# Patient Record
Sex: FEMALE | Race: ASIAN | NOT HISPANIC OR LATINO | Employment: FULL TIME | URBAN - METROPOLITAN AREA
[De-identification: names, ages, dates, MRNs, and addresses within clinical notes are randomized per-mention and may not be internally consistent; named-entity substitution may affect disease eponyms.]

---

## 2023-01-09 LAB
HEPATITIS B SURFACE ANTIGEN (EXTERNAL): NEGATIVE
HEPATITIS B SURFACE ANTIGEN (EXTERNAL): NEGATIVE
HIV1+2 AB SERPL QL IA: NEGATIVE
HIV1+2 AB SERPL QL IA: NEGATIVE
RUBELLA ANTIBODY IGG (EXTERNAL): NORMAL
RUBELLA ANTIBODY IGG (EXTERNAL): NORMAL
TREPONEMA PALLIDUM ANTIBODY (EXTERNAL): NONREACTIVE

## 2023-07-13 LAB — GROUP B STREPTOCOCCUS (EXTERNAL): POSITIVE

## 2023-08-08 ENCOUNTER — MEDICAL CORRESPONDENCE (OUTPATIENT)
Dept: HEALTH INFORMATION MANAGEMENT | Facility: CLINIC | Age: 33
End: 2023-08-08

## 2023-08-16 ENCOUNTER — HOSPITAL ENCOUNTER (OUTPATIENT)
Facility: CLINIC | Age: 33
Discharge: HOME | End: 2023-08-16
Attending: OBSTETRICS & GYNECOLOGY | Admitting: OBSTETRICS & GYNECOLOGY
Payer: COMMERCIAL

## 2023-08-16 VITALS — TEMPERATURE: 98.3 F | DIASTOLIC BLOOD PRESSURE: 74 MMHG | RESPIRATION RATE: 16 BRPM | SYSTOLIC BLOOD PRESSURE: 112 MMHG

## 2023-08-16 PROBLEM — Z36.89 ENCOUNTER FOR TRIAGE IN PREGNANT PATIENT: Status: ACTIVE | Noted: 2023-08-16

## 2023-08-16 PROCEDURE — G0463 HOSPITAL OUTPT CLINIC VISIT: HCPCS

## 2023-08-16 RX ORDER — LEVOTHYROXINE SODIUM 150 UG/1
150 TABLET ORAL DAILY
COMMUNITY

## 2023-08-16 RX ORDER — VITAMIN A ACETATE, .BETA.-CAROTENE, ASCORBIC ACID, CHOLECALCIFEROL, .ALPHA.-TOCOPHEROL ACETATE, DL-, THIAMINE MONONITRATE, RIBOFLAVIN, NIACINAMIDE, PYRIDOXINE HYDROCHLORIDE, FOLIC ACID, CYANOCOBALAMIN, CALCIUM CARBONATE, FERROUS FUMARATE, ZINC OXIDE, AND CUPRIC OXIDE 2000; 2000; 120; 400; 22; 1.84; 3; 20; 10; 1; 12; 200; 27; 25; 2 [IU]/1; [IU]/1; MG/1; [IU]/1; MG/1; MG/1; MG/1; MG/1; MG/1; MG/1; UG/1; MG/1; MG/1; MG/1; MG/1
1 TABLET ORAL DAILY
COMMUNITY

## 2023-08-16 RX ORDER — ASPIRIN 81 MG/1
81 TABLET, CHEWABLE ORAL DAILY
Status: ON HOLD | COMMUNITY
End: 2023-08-19

## 2023-08-16 RX ORDER — LIDOCAINE 40 MG/G
CREAM TOPICAL
Status: DISCONTINUED | OUTPATIENT
Start: 2023-08-16 | End: 2023-08-16 | Stop reason: HOSPADM

## 2023-08-16 ASSESSMENT — ACTIVITIES OF DAILY LIVING (ADL)
ADLS_ACUITY_SCORE: 31
ADLS_ACUITY_SCORE: 31

## 2023-08-16 NOTE — DISCHARGE INSTRUCTIONS
Discharge Instruction for Undelivered Patients      You were seen for: {OB DC INST SEEN FOR OTHER:1570133}  We Consulted: Dr Aragon  You had (Test or Medicine):SVE, monitoring     Diet:   Drink 8 to 12 glasses of liquids (milk, juice, water) every day.  You may eat meals and snacks.     Activity:  Call your doctor or nurse midwife if your baby is moving less than usual.     Call your provider if you notice:  Swelling in your face or increased swelling in your hands or legs.  Headaches that are not relieved by Tylenol (acetaminophen).  Changes in your vision (blurring: seeing spots or stars.)  Nausea (sick to your stomach) and vomiting (throwing up).   Weight gain of 5 pounds or more per week.  Heartburn that doesn't go away.  Signs of bladder infection: pain when you urinate (use the toilet), need to go more often and more urgently.  The bag of buckley (rupture of membranes) breaks, or you notice leaking in your underwear.  Bright red blood in your underwear.  Abdominal (lower belly) or stomach pain.  For first baby: Contractions (tightening) less than 5 minutes apart for one hour or more.  Second (plus) baby: Contractions (tightening) less than 10 minutes apart and getting stronger.  *If less than 34 weeks: Contractions (tightening) more than 6 times in one hour.  Increase or change in vaginal discharge (note the color and amount)  Other: Call Dr office if anything changes    Follow-up:  Call L&D tomorrow morning 8/17/23 at 6:30 am to see if you are able to come in for induction.

## 2023-08-16 NOTE — PLAN OF CARE
Data: [facility] presented [facility]: 2023  6:11 AM.  Reason for maternal/fetal assessment is uterine contractions. Patient reports mild cramping q6 min, increasing in frequency since yesterday evening.  Patient is a .  Prenatal record reviewed. Pregnancy  has been complicated by GBS positive.  Gestational Age 41wk 1d. VSS. Fetal movement active. Patient denies leaking of vaginal fluid/rupture of membranes, abdominal pain, pelvic pressure, nausea, vomiting, headache, visual disturbances, epigastric or URQ pain, significant edema. Support person is present.   Action: Verbal consent for EFM. Triage assessment completed. Bill of rights reviewed.  Response: Patient verbalized agreement with plan. Will contact Dr. Aragon with update and for further orders.

## 2023-08-16 NOTE — PROVIDER NOTIFICATION
08/16/23 0756   Provider Notification   Provider Name/Title Mahesh   Method of Notification Phone   Notification Reason Patient Arrived;Status Update;CHAR Aragon notified of pt arrival, UC pattern, SVE & FHT's. Discussed need for 2 negative MRSA swabs. Pt has not been confirmed negative. Collect first swab tomorrow when pt comes for induction, assuming she is sent home this morning.

## 2023-08-17 ENCOUNTER — ANESTHESIA EVENT (OUTPATIENT)
Dept: OBGYN | Facility: CLINIC | Age: 33
End: 2023-08-17
Payer: COMMERCIAL

## 2023-08-17 ENCOUNTER — ANESTHESIA (OUTPATIENT)
Dept: OBGYN | Facility: CLINIC | Age: 33
End: 2023-08-17
Payer: COMMERCIAL

## 2023-08-17 ENCOUNTER — HOSPITAL ENCOUNTER (INPATIENT)
Facility: CLINIC | Age: 33
LOS: 3 days | Discharge: HOME | End: 2023-08-20
Attending: OBSTETRICS & GYNECOLOGY | Admitting: OBSTETRICS & GYNECOLOGY
Payer: COMMERCIAL

## 2023-08-17 DIAGNOSIS — Z36.89 ENCOUNTER FOR TRIAGE IN PREGNANT PATIENT: Primary | ICD-10-CM

## 2023-08-17 LAB
ABO/RH(D): NORMAL
ANTIBODY SCREEN: NEGATIVE
HGB BLD-MCNC: 12 G/DL (ref 11.7–15.7)
MRSA DNA SPEC QL NAA+PROBE: NEGATIVE
SA TARGET DNA: POSITIVE
SPECIMEN EXPIRATION DATE: NORMAL
T PALLIDUM AB SER QL: NONREACTIVE

## 2023-08-17 PROCEDURE — 250N000009 HC RX 250: Performed by: OBSTETRICS & GYNECOLOGY

## 2023-08-17 PROCEDURE — 250N000011 HC RX IP 250 OP 636: Mod: JZ | Performed by: ANESTHESIOLOGY

## 2023-08-17 PROCEDURE — 250N000011 HC RX IP 250 OP 636: Performed by: OBSTETRICS & GYNECOLOGY

## 2023-08-17 PROCEDURE — 370N000003 HC ANESTHESIA WARD SERVICE: Performed by: ANESTHESIOLOGY

## 2023-08-17 PROCEDURE — 86901 BLOOD TYPING SEROLOGIC RH(D): CPT | Performed by: OBSTETRICS & GYNECOLOGY

## 2023-08-17 PROCEDURE — 120N000001 HC R&B MED SURG/OB

## 2023-08-17 PROCEDURE — 85018 HEMOGLOBIN: CPT | Performed by: OBSTETRICS & GYNECOLOGY

## 2023-08-17 PROCEDURE — 258N000003 HC RX IP 258 OP 636: Performed by: OBSTETRICS & GYNECOLOGY

## 2023-08-17 PROCEDURE — 86780 TREPONEMA PALLIDUM: CPT | Performed by: OBSTETRICS & GYNECOLOGY

## 2023-08-17 PROCEDURE — 3E033VJ INTRODUCTION OF OTHER HORMONE INTO PERIPHERAL VEIN, PERCUTANEOUS APPROACH: ICD-10-PCS | Performed by: OBSTETRICS & GYNECOLOGY

## 2023-08-17 PROCEDURE — 86850 RBC ANTIBODY SCREEN: CPT | Performed by: OBSTETRICS & GYNECOLOGY

## 2023-08-17 PROCEDURE — 250N000011 HC RX IP 250 OP 636: Performed by: ANESTHESIOLOGY

## 2023-08-17 PROCEDURE — 00HU33Z INSERTION OF INFUSION DEVICE INTO SPINAL CANAL, PERCUTANEOUS APPROACH: ICD-10-PCS | Performed by: ANESTHESIOLOGY

## 2023-08-17 PROCEDURE — 3E0R3BZ INTRODUCTION OF ANESTHETIC AGENT INTO SPINAL CANAL, PERCUTANEOUS APPROACH: ICD-10-PCS | Performed by: ANESTHESIOLOGY

## 2023-08-17 PROCEDURE — 87641 MR-STAPH DNA AMP PROBE: CPT | Performed by: OBSTETRICS & GYNECOLOGY

## 2023-08-17 RX ORDER — PENICILLIN G 3000000 [IU]/50ML
3 INJECTION, SOLUTION INTRAVENOUS EVERY 4 HOURS
Status: DISCONTINUED | OUTPATIENT
Start: 2023-08-17 | End: 2023-08-18 | Stop reason: HOSPADM

## 2023-08-17 RX ORDER — FENTANYL CITRATE-0.9 % NACL/PF 10 MCG/ML
100 PLASTIC BAG, INJECTION (ML) INTRAVENOUS EVERY 5 MIN PRN
Status: DISCONTINUED | OUTPATIENT
Start: 2023-08-17 | End: 2023-08-18 | Stop reason: HOSPADM

## 2023-08-17 RX ORDER — NALOXONE HYDROCHLORIDE 0.4 MG/ML
0.2 INJECTION, SOLUTION INTRAMUSCULAR; INTRAVENOUS; SUBCUTANEOUS
Status: DISCONTINUED | OUTPATIENT
Start: 2023-08-17 | End: 2023-08-18 | Stop reason: HOSPADM

## 2023-08-17 RX ORDER — BUPIVACAINE HYDROCHLORIDE 2.5 MG/ML
INJECTION, SOLUTION EPIDURAL; INFILTRATION; INTRACAUDAL
Status: COMPLETED | OUTPATIENT
Start: 2023-08-17 | End: 2023-08-17

## 2023-08-17 RX ORDER — BUPIVACAINE HYDROCHLORIDE 2.5 MG/ML
10 INJECTION, SOLUTION EPIDURAL; INFILTRATION; INTRACAUDAL ONCE
Status: DISCONTINUED | OUTPATIENT
Start: 2023-08-17 | End: 2023-08-18 | Stop reason: HOSPADM

## 2023-08-17 RX ORDER — IBUPROFEN 800 MG/1
800 TABLET, FILM COATED ORAL
Status: DISCONTINUED | OUTPATIENT
Start: 2023-08-17 | End: 2023-08-18

## 2023-08-17 RX ORDER — KETOROLAC TROMETHAMINE 30 MG/ML
30 INJECTION, SOLUTION INTRAMUSCULAR; INTRAVENOUS
Status: DISCONTINUED | OUTPATIENT
Start: 2023-08-17 | End: 2023-08-18

## 2023-08-17 RX ORDER — LIDOCAINE 40 MG/G
CREAM TOPICAL
Status: DISCONTINUED | OUTPATIENT
Start: 2023-08-17 | End: 2023-08-18 | Stop reason: HOSPADM

## 2023-08-17 RX ORDER — NALOXONE HYDROCHLORIDE 0.4 MG/ML
0.4 INJECTION, SOLUTION INTRAMUSCULAR; INTRAVENOUS; SUBCUTANEOUS
Status: DISCONTINUED | OUTPATIENT
Start: 2023-08-17 | End: 2023-08-18 | Stop reason: HOSPADM

## 2023-08-17 RX ORDER — TRANEXAMIC ACID 10 MG/ML
1 INJECTION, SOLUTION INTRAVENOUS EVERY 30 MIN PRN
Status: DISCONTINUED | OUTPATIENT
Start: 2023-08-17 | End: 2023-08-18 | Stop reason: HOSPADM

## 2023-08-17 RX ORDER — TERBUTALINE SULFATE 1 MG/ML
0.25 INJECTION, SOLUTION SUBCUTANEOUS
Status: DISCONTINUED | OUTPATIENT
Start: 2023-08-17 | End: 2023-08-18 | Stop reason: HOSPADM

## 2023-08-17 RX ORDER — PROCHLORPERAZINE MALEATE 10 MG
10 TABLET ORAL EVERY 6 HOURS PRN
Status: DISCONTINUED | OUTPATIENT
Start: 2023-08-17 | End: 2023-08-18 | Stop reason: HOSPADM

## 2023-08-17 RX ORDER — NALBUPHINE HYDROCHLORIDE 20 MG/ML
2.5-5 INJECTION, SOLUTION INTRAMUSCULAR; INTRAVENOUS; SUBCUTANEOUS EVERY 6 HOURS PRN
Status: DISCONTINUED | OUTPATIENT
Start: 2023-08-17 | End: 2023-08-18

## 2023-08-17 RX ORDER — MISOPROSTOL 200 UG/1
400 TABLET ORAL
Status: DISCONTINUED | OUTPATIENT
Start: 2023-08-17 | End: 2023-08-18 | Stop reason: HOSPADM

## 2023-08-17 RX ORDER — METHYLERGONOVINE MALEATE 0.2 MG/ML
200 INJECTION INTRAVENOUS
Status: DISCONTINUED | OUTPATIENT
Start: 2023-08-17 | End: 2023-08-18 | Stop reason: HOSPADM

## 2023-08-17 RX ORDER — METOCLOPRAMIDE 10 MG/1
10 TABLET ORAL EVERY 6 HOURS PRN
Status: DISCONTINUED | OUTPATIENT
Start: 2023-08-17 | End: 2023-08-18 | Stop reason: HOSPADM

## 2023-08-17 RX ORDER — ONDANSETRON 2 MG/ML
4 INJECTION INTRAMUSCULAR; INTRAVENOUS EVERY 6 HOURS PRN
Status: DISCONTINUED | OUTPATIENT
Start: 2023-08-17 | End: 2023-08-18 | Stop reason: HOSPADM

## 2023-08-17 RX ORDER — OXYTOCIN 10 [USP'U]/ML
10 INJECTION, SOLUTION INTRAMUSCULAR; INTRAVENOUS
Status: DISCONTINUED | OUTPATIENT
Start: 2023-08-17 | End: 2023-08-18

## 2023-08-17 RX ORDER — FENTANYL CITRATE 50 UG/ML
100 INJECTION, SOLUTION INTRAMUSCULAR; INTRAVENOUS
Status: DISCONTINUED | OUTPATIENT
Start: 2023-08-17 | End: 2023-08-18 | Stop reason: HOSPADM

## 2023-08-17 RX ORDER — OXYTOCIN 10 [USP'U]/ML
10 INJECTION, SOLUTION INTRAMUSCULAR; INTRAVENOUS
Status: DISCONTINUED | OUTPATIENT
Start: 2023-08-17 | End: 2023-08-18 | Stop reason: HOSPADM

## 2023-08-17 RX ORDER — CITRIC ACID/SODIUM CITRATE 334-500MG
30 SOLUTION, ORAL ORAL
Status: DISCONTINUED | OUTPATIENT
Start: 2023-08-17 | End: 2023-08-18 | Stop reason: HOSPADM

## 2023-08-17 RX ORDER — METOCLOPRAMIDE HYDROCHLORIDE 5 MG/ML
10 INJECTION INTRAMUSCULAR; INTRAVENOUS EVERY 6 HOURS PRN
Status: DISCONTINUED | OUTPATIENT
Start: 2023-08-17 | End: 2023-08-18 | Stop reason: HOSPADM

## 2023-08-17 RX ORDER — PROCHLORPERAZINE 25 MG
25 SUPPOSITORY, RECTAL RECTAL EVERY 12 HOURS PRN
Status: DISCONTINUED | OUTPATIENT
Start: 2023-08-17 | End: 2023-08-18 | Stop reason: HOSPADM

## 2023-08-17 RX ORDER — CARBOPROST TROMETHAMINE 250 UG/ML
250 INJECTION, SOLUTION INTRAMUSCULAR
Status: DISCONTINUED | OUTPATIENT
Start: 2023-08-17 | End: 2023-08-18 | Stop reason: HOSPADM

## 2023-08-17 RX ORDER — OXYTOCIN/0.9 % SODIUM CHLORIDE 30/500 ML
100-340 PLASTIC BAG, INJECTION (ML) INTRAVENOUS CONTINUOUS PRN
Status: DISCONTINUED | OUTPATIENT
Start: 2023-08-17 | End: 2023-08-18

## 2023-08-17 RX ORDER — PENICILLIN G POTASSIUM 5000000 [IU]/1
5 INJECTION, POWDER, FOR SOLUTION INTRAMUSCULAR; INTRAVENOUS ONCE
Status: COMPLETED | OUTPATIENT
Start: 2023-08-17 | End: 2023-08-17

## 2023-08-17 RX ORDER — ONDANSETRON 4 MG/1
4 TABLET, ORALLY DISINTEGRATING ORAL EVERY 6 HOURS PRN
Status: DISCONTINUED | OUTPATIENT
Start: 2023-08-17 | End: 2023-08-18 | Stop reason: HOSPADM

## 2023-08-17 RX ORDER — SODIUM CHLORIDE, SODIUM LACTATE, POTASSIUM CHLORIDE, CALCIUM CHLORIDE 600; 310; 30; 20 MG/100ML; MG/100ML; MG/100ML; MG/100ML
INJECTION, SOLUTION INTRAVENOUS CONTINUOUS
Status: DISCONTINUED | OUTPATIENT
Start: 2023-08-17 | End: 2023-08-18 | Stop reason: HOSPADM

## 2023-08-17 RX ORDER — OXYTOCIN/0.9 % SODIUM CHLORIDE 30/500 ML
1-24 PLASTIC BAG, INJECTION (ML) INTRAVENOUS CONTINUOUS
Status: DISCONTINUED | OUTPATIENT
Start: 2023-08-17 | End: 2023-08-18 | Stop reason: HOSPADM

## 2023-08-17 RX ORDER — MISOPROSTOL 200 UG/1
800 TABLET ORAL
Status: DISCONTINUED | OUTPATIENT
Start: 2023-08-17 | End: 2023-08-18 | Stop reason: HOSPADM

## 2023-08-17 RX ORDER — OXYTOCIN/0.9 % SODIUM CHLORIDE 30/500 ML
340 PLASTIC BAG, INJECTION (ML) INTRAVENOUS CONTINUOUS PRN
Status: DISCONTINUED | OUTPATIENT
Start: 2023-08-17 | End: 2023-08-18 | Stop reason: HOSPADM

## 2023-08-17 RX ORDER — SCOLOPAMINE TRANSDERMAL SYSTEM 1 MG/1
1 PATCH, EXTENDED RELEASE TRANSDERMAL
Status: DISCONTINUED | OUTPATIENT
Start: 2023-08-17 | End: 2023-08-18 | Stop reason: HOSPADM

## 2023-08-17 RX ORDER — ACETAMINOPHEN 325 MG/1
650 TABLET ORAL EVERY 4 HOURS PRN
Status: DISCONTINUED | OUTPATIENT
Start: 2023-08-17 | End: 2023-08-18 | Stop reason: HOSPADM

## 2023-08-17 RX ADMIN — SODIUM CHLORIDE, POTASSIUM CHLORIDE, SODIUM LACTATE AND CALCIUM CHLORIDE: 600; 310; 30; 20 INJECTION, SOLUTION INTRAVENOUS at 09:18

## 2023-08-17 RX ADMIN — PENICILLIN G 3 MILLION UNITS: 3000000 INJECTION, SOLUTION INTRAVENOUS at 21:26

## 2023-08-17 RX ADMIN — Medication 10 ML/HR: at 17:04

## 2023-08-17 RX ADMIN — PENICILLIN G POTASSIUM 5 MILLION UNITS: 5000000 POWDER, FOR SOLUTION INTRAMUSCULAR; INTRAPLEURAL; INTRATHECAL; INTRAVENOUS at 09:34

## 2023-08-17 RX ADMIN — Medication 2 MILLI-UNITS/MIN: at 09:40

## 2023-08-17 RX ADMIN — SODIUM CHLORIDE, POTASSIUM CHLORIDE, SODIUM LACTATE AND CALCIUM CHLORIDE 500 ML: 600; 310; 30; 20 INJECTION, SOLUTION INTRAVENOUS at 16:46

## 2023-08-17 RX ADMIN — PENICILLIN G 3 MILLION UNITS: 3000000 INJECTION, SOLUTION INTRAVENOUS at 17:22

## 2023-08-17 RX ADMIN — PENICILLIN G 3 MILLION UNITS: 3000000 INJECTION, SOLUTION INTRAVENOUS at 13:13

## 2023-08-17 RX ADMIN — BUPIVACAINE HYDROCHLORIDE 15 ML: 2.5 INJECTION, SOLUTION EPIDURAL; INFILTRATION; INTRACAUDAL at 17:01

## 2023-08-17 ASSESSMENT — ACTIVITIES OF DAILY LIVING (ADL)
WALKING_OR_CLIMBING_STAIRS_DIFFICULTY: NO
TOILETING_ISSUES: NO
CONCENTRATING,_REMEMBERING_OR_MAKING_DECISIONS_DIFFICULTY: NO
ADLS_ACUITY_SCORE: 31
FALL_HISTORY_WITHIN_LAST_SIX_MONTHS: NO
ADLS_ACUITY_SCORE: 18
ADLS_ACUITY_SCORE: 18
DIFFICULTY_EATING/SWALLOWING: NO
DRESSING/BATHING_DIFFICULTY: NO
ADLS_ACUITY_SCORE: 18
WEAR_GLASSES_OR_BLIND: NO
ADLS_ACUITY_SCORE: 18
CHANGE_IN_FUNCTIONAL_STATUS_SINCE_ONSET_OF_CURRENT_ILLNESS/INJURY: NO
DOING_ERRANDS_INDEPENDENTLY_DIFFICULTY: NO

## 2023-08-17 NOTE — PLAN OF CARE
Data: Patient presented to [facility]: 2023  8:10 AM.  Reason for maternal/fetal admission is  induction of labor . Patient reports intermittent contractions. Patient denies leaking of vaginal fluid/rupture of membranes, vaginal bleeding, abdominal pain, pelvic pressure, nausea, vomiting, headache, visual disturbances, epigastric or RUQ pain, significant edema. Patient reports fetal movement is normal. Patient is a 41w2d . Prenatal record reviewed. Pregnancy has been complicated by group B strep , and hypothyroidism. Support person is present.     Fetal HR baseline was  , variability is  . Accelerations:  . Decelerations:  . Uterine assessment is   during contractions and   at rest. Cervix 1.5 cm dilated and 80-90% effaced. Fetal station -2. Fetal presentation cephalic per cervical exam. Membranes: intact.    Vital signs wnl. Patient reports no pain and is coping.     Action: Verbal consent for EFM. Triage assessment completed. Discussed plan of care with provider and patient. Patient educated on room and how to call for help, as well as symptoms to report.     Response: Patient verbalized agreement with plan.

## 2023-08-17 NOTE — ANESTHESIA PREPROCEDURE EVALUATION
Anesthesia Pre-Procedure Evaluation    Patient: Laurel Cowan   MRN: [id number] : 1990        Procedure : * No procedures listed *          Past Medical History:   Diagnosis Date    Thyroid disease       History reviewed. No pertinent surgical history.   No Known Allergies   Social History     Tobacco Use    Smoking status: Never    Smokeless tobacco: Never   Substance Use Topics    Alcohol use: Never      Wt Readings from Last 1 Encounters:   23 87.1 kg (192 lb)        Anesthesia Evaluation            ROS/MED HX  ENT/Pulmonary:  - neg pulmonary ROS     Neurologic:  - neg neurologic ROS     Cardiovascular:  - neg cardiovascular ROS     METS/Exercise Tolerance: >4 METS    Hematologic:  - neg hematologic  ROS     Musculoskeletal:  - neg musculoskeletal ROS     GI/Hepatic:  - neg GI/hepatic ROS     Renal/Genitourinary:  - neg Renal ROS     Endo:  - neg endo ROS   (+)          thyroid problem,            Psychiatric/Substance Use:  - neg psychiatric ROS     Infectious Disease:  - neg infectious disease ROS     Malignancy:  - neg malignancy ROS     Other:  - neg other ROS          Physical Exam    Airway        Mallampati: II    Neck ROM: full     Respiratory Devices and Support         Dental           Cardiovascular   cardiovascular exam normal       Rhythm and rate: regular     Pulmonary   pulmonary exam normal                OUTSIDE LABS:  CBC:   Lab Results   Component Value Date    HGB 12.0 2023     BMP: No results found for: NA, POTASSIUM, CHLORIDE, CO2, BUN, CR, GLC  COAGS: No results found for: PTT, INR, FIBR  POC: No results found for: BGM, HCG, HCGS  HEPATIC: No results found for: ALBUMIN, PROTTOTAL, ALT, AST, GGT, ALKPHOS, BILITOTAL, BILIDIRECT, IRWIN  OTHER: No results found for: PH, LACT, A1C, TL, PHOS, MAG, LIPASE, AMYLASE, TSH, T4, T3, CRP, SED    Anesthesia Plan    ASA Status:  2       Anesthesia Type: Epidural.              Consents            Postoperative Care             Comments:    Other Comments: .Lab Test        08/17/23                       0909          HGB          12.0           No lab results found.              Tom Engle, DO

## 2023-08-17 NOTE — ANESTHESIA PROCEDURE NOTES
"Epidural catheter Procedure Note    Pre-Procedure   Staff -        Anesthesiologist:  Tom Engle DO       Performed By: anesthesiologist       Referred By: DO Rupa       Location: OB       Pre-Anesthestic Checklist: patient identified, IV checked, risks and benefits discussed, informed consent, monitors and equipment checked, pre-op evaluation and at physician/surgeon's request  Timeout:       Correct Patient: Yes        Correct Procedure: Yes        Correct Site: Yes        Correct Position: Yes   Procedure Documentation  Procedure: epidural catheter       Patient Position: sitting       Patient Prep/Sterile Barriers: sterile gloves, mask, patient draped       Skin prep: Betadine       Local skin infiltrated with mL of 1% lidocaine.        Insertion Site: L2-3. (midline approach).       Technique: LORT saline        Needle Type: [employer] needle       Needle Gauge: 17.        Needle Length (Inches): 3.5           Catheter threaded easily.             # of attempts: 2 and  # of redirects:  0    Assessment/Narrative         Paresthesias: No.       Insertion/Infusion Method: LORT saline       Aspiration negative for Heme or CSF via Epidural Catheter.    Medication(s) Administered   0.25% Bupivacaine PF (Epidural) - EPIDURAL   15 mL - 8/17/2023 5:01:00 PM    FOR [facility] ([facility]) ONLY:   Pain Team Contact information: please page the Pain Team Via [employer]. Search \"Pain\". During daytime hours, please page the attending first. At night please page the resident first.      "

## 2023-08-17 NOTE — H&P
[facility]     History and Physical  Obstetrics and Gynecology     Date of Admission:  2023    History of Present Illness   Laurel Cowan is a 32 year old female  41w2d with Estimated Date of Delivery: Aug 8, 2023 who presents for induction for post dates      PRENATAL COURSE  Prenatal care was received at [facility] and the  course was complicated by IVF, hypothyroidism, GBS +, history of MRSA      No lab results found.  Rhogam not indicated   Recent Labs   Lab Test 23  1200   GBS Positive*       Past Medical History    I have reviewed this patient's medical history and updated it with pertinent information if needed.   Past Medical History:   Diagnosis Date    Thyroid disease        Past Surgical History   I have reviewed this patient's surgical history and updated it with pertinent information if needed.  History reviewed. No pertinent surgical history.    Prior to Admission Medications   Prior to Admission Medications   Prescriptions Last Dose Informant Patient Reported? Taking?   Prenatal Vit-Fe Fumarate-FA (PNV PRENATAL PLUS MULTIVITAMIN) 27-1 MG TABS per tablet 2023  Yes Yes   Sig: Take 1 tablet by mouth daily   aspirin (ASA) 81 MG chewable tablet 2023  Yes Yes   Sig: Take 81 mg by mouth daily   levothyroxine (SYNTHROID/LEVOTHROID) 150 MCG tablet 2023  Yes Yes   Sig: Take 150 mcg by mouth daily      Facility-Administered Medications: None     Allergies   No Known Allergies    Social History   I have reviewed this patient's social history and updated it with pertinent information if needed. Laurel Cowan  reports that she has never smoked. She has never used smokeless tobacco. She reports that she does not drink alcohol and does not use drugs.    Family History   Family history reviewed with patient and is noncontributory.    Immunization History   Immunizations are up to date    Physical Exam   Temp: 98.4  F (36.9  C)  Temp src: Oral   Pulse: 94   Resp: 17 SpO2: 99 % O2 Device: None (Room air)    Vital Signs with Ranges  Temp:  [98.4  F (36.9  C)] 98.4  F (36.9  C)  Pulse:  [94] 94  Resp:  [17] 17  SpO2:  [99 %] 99 %  Fetal Heart Tones: 140 baseline, moderate variablility, + accels, no decels, and Category I  TOCO: frequency q 3 minutes    Constitutional: healthy, alert, and active   Respiratory: No increased work of breathing, good air exchange, clear to auscultation bilaterally, no crackles or wheezing  Cardiovascular: Normal apical impulse, regular rate and rhythm, normal S1 and S2, no S3 or S4, and no murmur noted  Abdomen: gravid, single vertex fetus, non-tender, EFW 7 lbs 10  Cervical Exam: 1.5/ 80/ Mid/ soft/ -2        Assessment & Plan   Laurel Cowan is a 32 year old female  who presents at 41w2d for induction.   GBS positive  .   NST reactive.  Category  I.     Admit - see IP orders  Labor induction with Pitocin  Pain medication as desires   Prophylactic antibiotic for + GBS status  Anticipate     Yessi Goode, DO, DO    Robert Barry)  Cardiovascular Medicine  [address] [place] [address]  Phone: [phone]  Fax: [phone]  Follow Up Time:     Win Stewart)  Orthopaedic Surgery  [address] [place] [address]  Phone: [phone]  Fax: [phone]  Follow Up Time:

## 2023-08-17 NOTE — DISCHARGE INSTRUCTIONS
Postpartum Vaginal Delivery Instructions    Activity     Ask family and friends for help when you need it.  Do not place anything in your vagina for 6 weeks.  You are not restricted on other activities, but take it easy for a few weeks to allow your body to recover from delivery.  You are able to do any activities you feel up to that point.  No driving until you have stopped taking your pain medications (usually two weeks after delivery).     Call your health care provider if you have any of these symptoms:     Increased pain, swelling, redness, or fluid around your stiches from an episiotomy or perineal tear.  A fever above 100.4 F (38 C) with or without chills when placing a thermometer under your tongue.  You soak a sanitary pad with blood within 1 hour, or you see blood clots larger than a golf ball.  Bleeding that lasts more than 6 weeks.  Vaginal discharge that smells bad.  Severe pain, cramping or tenderness in your lower belly area.  A need to urinate more frequently (use the toilet more often), more urgently (use the toilet very quickly), or it burns when you urinate.  Nausea and vomiting.  Redness, swelling or pain around a vein in your leg.  Problems breastfeeding or a red or painful area on your breast.  Chest pain and cough or are gasping for air.  Problems coping with sadness, anxiety, or depression.  If you have any concerns about hurting yourself or the baby, call your provider immediately.   You have questions or concerns after you return home.     Keep your hands clean:  Always wash your hands before touching your perineal area and stitches.  This helps reduce your risk of infection.  If your hands aren't dirty, you may use an alcohol hand-rub to clean your hands. Keep your nails clean and short.        2006 [facility]. Reprinted with permission. [employer] [id number] - REV 04/10.  Postpartum Depression  When Caring for Your Baby Is Not What You Expected  Stories from other  "mothers  \"This was my third baby, but it wasn't the happy, joyful experience I had expected. I felt anxious and irritable. I didn't want to get out of bed in the morning. I didn't feel connected to my baby.\" - Tatiana  \"We were thrilled to bring home our first healthy baby. But in those first few weeks I felt tired and cried easily. I thought it was just the hormones and getting used to a . After six months, when little things would still set me off, my  convinced me to talk to my doctor.\" - Lisset  \"I love children and couldn't wait to have my own. Then my  went back to work, and I started having thoughts about hurting my baby. No matter what I did, I couldn't stop the thoughts. I lived in fear but kept it a secret.\" - Violet  \"It has been two months since I saw my doctor, and I feel like a different person. The medicine has helped and my family has been very supportive. I have energy again, and I love being a mother.\" - Ny  You are not alone  Although postpartum depression is common, it is serious--and treatable. If you think you might have it, tell your doctor or another health care provider. With help, you can feel like yourself again.   The baby blues   Having a baby brings big changes in a woman's life. These changes can be overwhelming. While most moms get past the \"baby blues\" within the first two weeks, some moms struggle for longer.   If the baby blues last longer than two weeks, you may have postpartum depression.  Postpartum depression  It is easy to confuse the symptoms of postpartum depression with normal hormone changes. How can you tell if it's serious? Watch for these symptoms:  Feeling sad, anxious or \"empty\"  Lack of energy, feeling very tired  Lack of interest in normal activities  Changes in sleeping or eating patterns  Feeling hopeless, helpless, guilty or worthless  Feeling little and irritable  Problems concentrating or making simple decisions  Thoughts about hurting your " "baby, even if you will not act on them  Thoughts about death or suicide  Things you can do  Being a good mom means taking care of yourself. If you take care of yourself, you can take better care of your baby and your family.  Get help. Talk with your care provider, call an emergency support line or ask a loved one to help you get the care you need.  Ask your care provider about medicines that can be safely used for postpartum depression.  Talk to a therapist, alone or in group therapy.  Ask your cailin or community leaders about other support resources.  Learn as much as you can about postpartum depression.  Get support from family and friends. Ask for help when you need it.  Keep active by walking, stretching, swimming and so on.  Get enough rest.  Eat a healthy diet.  Don't give up! It may take more than one try to get the help you need.  What you should know  It is very common for new moms to have the \"baby blues.\" They often start a few days after a baby's birth. Usually, feeling sad and irritable will not stop you from taking care of your baby or yourself.   If symptoms interfere with your life or last longer than two to three weeks, you may have postpartum depression. This affects up to 2 out of 10 moms. It can occur any time in your baby's first year.   Women who have a history of depression are more likely to become depressed during pregnancy or after birth. Depression can be caused by stress, hormone changes, trauma, lack of support and other factors.   If you are depressed, you need to get help. It will not get better on its own.  The best treatment   The most effective treatment for depression includes:  Individual or group therapy  Medicine that can be safely used while breastfeeding (prescribed by your doctor)  Support from your family, friends and community  Who to contact for help   [facility]: 1-319.454.7708; -700-7396  [facility]: 922.167.6075 (Specialists in postpartum " depression offer individual, couples and group therapy)   [facility]: www.ada.org  National Suicide Prevention Lifeline: 0-084-153-TALK [8889]  PPD Hope Information Center: www.ppdhope.com  Pregnancy and Postpartum Support [place]: www.pregnancypostpartumsupportmn.org  This brochure meets the requirements of [place] Statute 145.906. For more information, call the [place] Department of Health at 823-675-9348 or visit our website at www.[email or web address].us/divs/fh/mch/.

## 2023-08-18 LAB
APTT PPP: 29 SECONDS (ref 22–38)
ERYTHROCYTE [DISTWIDTH] IN BLOOD BY AUTOMATED COUNT: 15.3 % (ref 10–15)
FIBRINOGEN PPP-MCNC: 321 MG/DL (ref 170–490)
HCT VFR BLD AUTO: 32.3 % (ref 35–47)
HGB BLD-MCNC: 10.1 G/DL (ref 11.7–15.7)
INR PPP: 1.11 (ref 0.85–1.15)
MCH RBC QN AUTO: 29.3 PG (ref 26.5–33)
MCHC RBC AUTO-ENTMCNC: 31.3 G/DL (ref 31.5–36.5)
MCV RBC AUTO: 94 FL (ref 78–100)
PLATELET # BLD AUTO: 145 10E3/UL (ref 150–450)
RBC # BLD AUTO: 3.45 10E6/UL (ref 3.8–5.2)
WBC # BLD AUTO: 20.3 10E3/UL (ref 4–11)

## 2023-08-18 PROCEDURE — 250N000011 HC RX IP 250 OP 636: Mod: JZ | Performed by: OBSTETRICS & GYNECOLOGY

## 2023-08-18 PROCEDURE — 258N000003 HC RX IP 258 OP 636: Performed by: OBSTETRICS & GYNECOLOGY

## 2023-08-18 PROCEDURE — 36415 COLL VENOUS BLD VENIPUNCTURE: CPT | Performed by: OBSTETRICS & GYNECOLOGY

## 2023-08-18 PROCEDURE — 722N000001 HC LABOR CARE VAGINAL DELIVERY SINGLE

## 2023-08-18 PROCEDURE — 85730 THROMBOPLASTIN TIME PARTIAL: CPT | Performed by: OBSTETRICS & GYNECOLOGY

## 2023-08-18 PROCEDURE — 250N000013 HC RX MED GY IP 250 OP 250 PS 637: Performed by: OBSTETRICS & GYNECOLOGY

## 2023-08-18 PROCEDURE — 85610 PROTHROMBIN TIME: CPT | Performed by: OBSTETRICS & GYNECOLOGY

## 2023-08-18 PROCEDURE — 999N000016 HC STATISTIC ATTENDANCE AT DELIVERY

## 2023-08-18 PROCEDURE — 85384 FIBRINOGEN ACTIVITY: CPT | Performed by: OBSTETRICS & GYNECOLOGY

## 2023-08-18 PROCEDURE — 85027 COMPLETE CBC AUTOMATED: CPT | Performed by: OBSTETRICS & GYNECOLOGY

## 2023-08-18 PROCEDURE — 120N000001 HC R&B MED SURG/OB

## 2023-08-18 PROCEDURE — 250N000009 HC RX 250: Performed by: OBSTETRICS & GYNECOLOGY

## 2023-08-18 PROCEDURE — 0DQR0ZZ REPAIR ANAL SPHINCTER, OPEN APPROACH: ICD-10-PCS | Performed by: OBSTETRICS & GYNECOLOGY

## 2023-08-18 RX ORDER — OXYTOCIN 10 [USP'U]/ML
10 INJECTION, SOLUTION INTRAMUSCULAR; INTRAVENOUS
Status: DISCONTINUED | OUTPATIENT
Start: 2023-08-18 | End: 2023-08-20 | Stop reason: HOSPADM

## 2023-08-18 RX ORDER — HYDROCORTISONE 25 MG/G
CREAM TOPICAL 3 TIMES DAILY PRN
Status: DISCONTINUED | OUTPATIENT
Start: 2023-08-18 | End: 2023-08-20 | Stop reason: HOSPADM

## 2023-08-18 RX ORDER — TRANEXAMIC ACID 10 MG/ML
1 INJECTION, SOLUTION INTRAVENOUS EVERY 30 MIN PRN
Status: DISCONTINUED | OUTPATIENT
Start: 2023-08-18 | End: 2023-08-20 | Stop reason: HOSPADM

## 2023-08-18 RX ORDER — OXYCODONE HYDROCHLORIDE 5 MG/1
5 TABLET ORAL EVERY 4 HOURS PRN
Status: DISCONTINUED | OUTPATIENT
Start: 2023-08-18 | End: 2023-08-20 | Stop reason: HOSPADM

## 2023-08-18 RX ORDER — NALOXONE HYDROCHLORIDE 0.4 MG/ML
0.4 INJECTION, SOLUTION INTRAMUSCULAR; INTRAVENOUS; SUBCUTANEOUS
Status: DISCONTINUED | OUTPATIENT
Start: 2023-08-18 | End: 2023-08-20 | Stop reason: HOSPADM

## 2023-08-18 RX ORDER — MODIFIED LANOLIN
OINTMENT (GRAM) TOPICAL
Status: DISCONTINUED | OUTPATIENT
Start: 2023-08-18 | End: 2023-08-20 | Stop reason: HOSPADM

## 2023-08-18 RX ORDER — MISOPROSTOL 200 UG/1
400 TABLET ORAL
Status: DISCONTINUED | OUTPATIENT
Start: 2023-08-18 | End: 2023-08-20 | Stop reason: HOSPADM

## 2023-08-18 RX ORDER — DOCUSATE SODIUM 100 MG/1
100 CAPSULE, LIQUID FILLED ORAL 2 TIMES DAILY
Status: DISCONTINUED | OUTPATIENT
Start: 2023-08-18 | End: 2023-08-20 | Stop reason: HOSPADM

## 2023-08-18 RX ORDER — ACETAMINOPHEN 325 MG/1
650 TABLET ORAL EVERY 4 HOURS PRN
Status: DISCONTINUED | OUTPATIENT
Start: 2023-08-18 | End: 2023-08-20 | Stop reason: HOSPADM

## 2023-08-18 RX ORDER — METHYLERGONOVINE MALEATE 0.2 MG/ML
200 INJECTION INTRAVENOUS
Status: DISCONTINUED | OUTPATIENT
Start: 2023-08-18 | End: 2023-08-20 | Stop reason: HOSPADM

## 2023-08-18 RX ORDER — DIPHENHYDRAMINE HYDROCHLORIDE 50 MG/ML
50 INJECTION INTRAMUSCULAR; INTRAVENOUS
Status: DISCONTINUED | OUTPATIENT
Start: 2023-08-18 | End: 2023-08-20 | Stop reason: HOSPADM

## 2023-08-18 RX ORDER — NALOXONE HYDROCHLORIDE 0.4 MG/ML
0.2 INJECTION, SOLUTION INTRAMUSCULAR; INTRAVENOUS; SUBCUTANEOUS
Status: DISCONTINUED | OUTPATIENT
Start: 2023-08-18 | End: 2023-08-20 | Stop reason: HOSPADM

## 2023-08-18 RX ORDER — CARBOPROST TROMETHAMINE 250 UG/ML
250 INJECTION, SOLUTION INTRAMUSCULAR
Status: DISCONTINUED | OUTPATIENT
Start: 2023-08-18 | End: 2023-08-20 | Stop reason: HOSPADM

## 2023-08-18 RX ORDER — LEVOTHYROXINE SODIUM 75 UG/1
150 TABLET ORAL DAILY
Status: DISCONTINUED | OUTPATIENT
Start: 2023-08-18 | End: 2023-08-20 | Stop reason: HOSPADM

## 2023-08-18 RX ORDER — METHYLPREDNISOLONE SODIUM SUCCINATE 125 MG/2ML
125 INJECTION, POWDER, LYOPHILIZED, FOR SOLUTION INTRAMUSCULAR; INTRAVENOUS
Status: DISCONTINUED | OUTPATIENT
Start: 2023-08-18 | End: 2023-08-20 | Stop reason: HOSPADM

## 2023-08-18 RX ORDER — IBUPROFEN 800 MG/1
800 TABLET, FILM COATED ORAL EVERY 6 HOURS PRN
Status: DISCONTINUED | OUTPATIENT
Start: 2023-08-18 | End: 2023-08-20 | Stop reason: HOSPADM

## 2023-08-18 RX ORDER — PRENATAL VIT/IRON FUM/FOLIC AC 27MG-0.8MG
1 TABLET ORAL DAILY
Status: DISCONTINUED | OUTPATIENT
Start: 2023-08-18 | End: 2023-08-20 | Stop reason: HOSPADM

## 2023-08-18 RX ORDER — OXYTOCIN/0.9 % SODIUM CHLORIDE 30/500 ML
340 PLASTIC BAG, INJECTION (ML) INTRAVENOUS CONTINUOUS PRN
Status: COMPLETED | OUTPATIENT
Start: 2023-08-18 | End: 2023-08-18

## 2023-08-18 RX ORDER — MISOPROSTOL 200 UG/1
800 TABLET ORAL
Status: DISCONTINUED | OUTPATIENT
Start: 2023-08-18 | End: 2023-08-20 | Stop reason: HOSPADM

## 2023-08-18 RX ADMIN — Medication 340 ML/HR: at 01:03

## 2023-08-18 RX ADMIN — Medication 100 ML/HR: at 04:07

## 2023-08-18 RX ADMIN — SODIUM CHLORIDE, POTASSIUM CHLORIDE, SODIUM LACTATE AND CALCIUM CHLORIDE 500 ML: 600; 310; 30; 20 INJECTION, SOLUTION INTRAVENOUS at 04:45

## 2023-08-18 RX ADMIN — LEVOTHYROXINE SODIUM 150 MCG: 0.07 TABLET ORAL at 07:39

## 2023-08-18 RX ADMIN — BENZOCAINE: 11.4 AEROSOL, SPRAY TOPICAL at 22:08

## 2023-08-18 RX ADMIN — ACETAMINOPHEN 650 MG: 325 TABLET, FILM COATED ORAL at 03:07

## 2023-08-18 RX ADMIN — DOCUSATE SODIUM 100 MG: 100 CAPSULE, LIQUID FILLED ORAL at 21:50

## 2023-08-18 RX ADMIN — IBUPROFEN 800 MG: 800 TABLET ORAL at 17:40

## 2023-08-18 RX ADMIN — DOCUSATE SODIUM 100 MG: 100 CAPSULE, LIQUID FILLED ORAL at 07:39

## 2023-08-18 RX ADMIN — IBUPROFEN 800 MG: 800 TABLET ORAL at 03:07

## 2023-08-18 RX ADMIN — ACETAMINOPHEN 650 MG: 325 TABLET, FILM COATED ORAL at 21:50

## 2023-08-18 RX ADMIN — IBUPROFEN 800 MG: 800 TABLET ORAL at 10:25

## 2023-08-18 RX ADMIN — IRON SUCROSE 300 MG: 20 INJECTION, SOLUTION INTRAVENOUS at 14:27

## 2023-08-18 RX ADMIN — PRENATAL VITAMINS-IRON FUMARATE 27 MG IRON-FOLIC ACID 0.8 MG TABLET 1 TABLET: at 07:39

## 2023-08-18 RX ADMIN — ACETAMINOPHEN 650 MG: 325 TABLET, FILM COATED ORAL at 08:11

## 2023-08-18 RX ADMIN — TRANEXAMIC ACID 1 G: 10 INJECTION, SOLUTION INTRAVENOUS at 01:15

## 2023-08-18 ASSESSMENT — ACTIVITIES OF DAILY LIVING (ADL)
ADLS_ACUITY_SCORE: 18
ADLS_ACUITY_SCORE: 18
ADLS_ACUITY_SCORE: 22
ADLS_ACUITY_SCORE: 22
ADLS_ACUITY_SCORE: 19
ADLS_ACUITY_SCORE: 22
ADLS_ACUITY_SCORE: 19
ADLS_ACUITY_SCORE: 22
ADLS_ACUITY_SCORE: 19
ADLS_ACUITY_SCORE: 19
ADLS_ACUITY_SCORE: 18
ADLS_ACUITY_SCORE: 19

## 2023-08-18 NOTE — PROVIDER NOTIFICATION
08/17/23 1850   Provider Notification   Provider Name/Title Dr. Goode   Method of Notification In Department   Request Evaluate - Remote   Notification Reason SVE;Status Update;Pain;Labor Status     Dr. Goode aware of SVE and labor status. Provider on site when needed.

## 2023-08-18 NOTE — DISCHARGE SUMMARY
[facility]    Discharge Summary  Obstetrics    Date of Admission:  2023  Date of Discharge:  2023  Discharging Provider: Rebecca Yan MD    Discharge Diagnoses     Hypothyroidism  MRSA positive  PPH   3rd degree laceration   Vaginal hematoma     History of Present Illness   Laurel Cowan is a 32 year old female now  who presented to L&D @ 41w3d for induction. Her pregnancy has been complicated by  IVF, hypothyroidism, GBS +, history of MRSA . Please see her admit H&P for full details of her PMH, PSH, Meds, Allergies and exam on admit.    Hospital Course   The patient had a Normal spontaneous vaginal delivery @ 41w3d, please see her delivery summary for full details.     The patient had a complex lacerations with right sulcus laceration extending into a complete 3rd degree with labial extensions. These were repaired with #3-0 vicryl with 4 interrupted suture in the external sphincter. She was also noted to have a left 3cm vaginal wall hematoma that ruptured spontaneously and required pressure and sutures for hemostasis.    EBL for the delivery was 1321cc due to atony and bleeding from lacerations.    Her postpartum course was uncomplicated. On postpartum day 2, she was meeting all of her postpartum goals and deemed stable for discharge. She was voiding without difficulty, tolerating a regular diet without nausea and vomiting, her pain was well controlled on oral pain medicines and her lochia was appropriate.    Hgb:   Lab Results   Component Value Date    HGB 12.0 2023       No results found for: RH and rhogam was not given    Contraception was discussed and will be addressed at her postpartum appointment.    Instructions:  1) Call for temperature greater than 100.4F, foul smelling vaginal discharge, bleeding more than 1 pad per hour for 2 hrs, pain not controlled by oral pain meds, severe constipation or severe nausea or vomiting.  2) She was instructed to  follow-up with her primary OB in 6 weeks for a routine postpartum visit  3) She was instructed to continue her PNV on discharge if she wished to breast feed her infant.    Dr. Yan    Discharge Disposition   Discharged to home   Condition at discharge: Stable    Primary Care Physician   Physician No Ref-Primary    Consultations This Hospital Stay   ANESTHESIOLOGY IP CONSULT  LACTATION IP CONSULT    Discharge Orders      Breast pump - Manual/Electric    Breast Pump Documentation:  Manual/Electric Pump: To support adequate breast milk production and nutrition for infant.     I, the undersigned, certify that the above prescribed supplies are medically necessary for this patient and is both reasonable and necessary in reference to accepted standards of medical and necessary in reference to accepted standards of medical practice in the treatment of this patient's condition and is not prescribed as a convenience.     Discharge Medications   Current Discharge Medication List        CONTINUE these medications which have NOT CHANGED    Details   aspirin (ASA) 81 MG chewable tablet Take 81 mg by mouth daily      levothyroxine (SYNTHROID/LEVOTHROID) 150 MCG tablet Take 150 mcg by mouth daily      Prenatal Vit-Fe Fumarate-FA (PNV PRENATAL PLUS MULTIVITAMIN) 27-1 MG TABS per tablet Take 1 tablet by mouth daily           Allergies   No Known Allergies

## 2023-08-18 NOTE — PROVIDER NOTIFICATION
08/18/23 0500   Provider Notification   Provider Name/Title Dr. Goode   Method of Notification Phone   Request Evaluate - Remote     MD updated on CBC, PTT, and INR results. Pitocin infusing at 100 mL/hr. LR bolus infused. Pt asymptomatic while laying in bed.    Orders for no repeat CBC, PTT, INR labs unless pt is symptomatic later on and to stop the pitocin.

## 2023-08-18 NOTE — L&D DELIVERY NOTE
Laurel Cowan is a 32 year old-year-old    female,  1 para 0 with  EDC 23, who was admitted for induction for post-dates at 41 weeks gestation.    Her prenatal care was at the [facility] in [place]. Prenatal course was complicated by  IVF, hypothyroidism, GBS +, history of MRSA   . Vaginal Group B Streptococcus culture was positive.  Oxytocin induction/augmentation was initiated per standard protocol for absent/inadequate labor.  Patient received an epidural/narcotic injection for pain relief.  The patient achieved complete dilation at 2253. She went on to deliver a 10 #, 0 oz male infant at 100 by . Apgars were  9 at one minute and 9 at five minutes. The fetal oropharynx was bulb suctioned on the perineum.  There was one nuchal cord. The placenta delivered spontaneously and intact at 105.  TXA was given for uterine atony along with pitocin.  The patient had a complex lacerations with right sulcus laceration extending into a complete 3rd degree with labial extensions. These were repaired with #3-0 vicryl with 4 interrupted suture in the external sphincter. She was also noted to have a left 3cm vaginal wall hematoma that ruptured spontaneously and required pressure and sutures for hemostasis.    EBL for the delivery was 1321cc due to atony and bleeding from lacerations.    Dr. Yessi Goode DO

## 2023-08-18 NOTE — PROGRESS NOTES
[facility]   Post-partum Note    Name:  Laurel Cowan  MRN: [id number]    S: Patient state she is going oK.  Pain is controlled when she is sitting in bed.  Hasn't really moved since she passed out this AM.  Tolerating regular diet without nausea or vomiting, but limited. She has not yet urinated, passing flatus but no bowel movement as of yet.  Lochia similar to heavy menses.  Breast feeding.    O:   Patient Vitals for the past 24 hrs:   BP Temp Temp src Pulse Resp SpO2   08/18/23 0744 95/62 98  F (36.7  C) Oral 77 16 --   08/18/23 0603 97/63 97.8  F (36.6  C) Oral 73 16 --   08/18/23 0500 117/62 -- -- -- -- --   08/18/23 0445 126/57 -- -- -- -- --   08/18/23 0430 108/55 -- -- -- -- --   08/18/23 0415 107/62 -- -- -- -- --   08/18/23 0400 107/62 98  F (36.7  C) Oral -- -- --   08/18/23 0345 125/67 -- -- -- -- --   08/18/23 0330 115/71 -- -- -- -- --   08/18/23 0315 112/70 98.6  F (37  C) Oral -- -- --   08/18/23 0300 107/67 -- -- -- -- --   08/18/23 0230 109/72 -- -- -- -- --   08/18/23 0215 105/62 -- -- -- -- --   08/18/23 0200 108/62 -- -- -- -- --   08/18/23 0145 118/64 -- -- -- -- --   08/18/23 0130 112/60 -- -- -- -- --   08/18/23 0115 120/59 -- -- -- -- --   08/18/23 0111 -- 98.3  F (36.8  C) Oral -- -- --   08/18/23 0028 130/64 -- -- -- -- --   08/18/23 0016 (!) 167/63 -- -- -- -- --   08/18/23 0014 -- 98.3  F (36.8  C) Oral -- -- --   08/18/23 0000 130/77 -- -- -- -- --   08/17/23 2335 132/59 98.4  F (36.9  C) Oral -- -- --   08/17/23 2100 123/78 98.7  F (37.1  C) Oral -- 18 --   08/17/23 2015 -- 98.4  F (36.9  C) Oral -- -- --   08/17/23 1930 125/79 -- -- -- -- --   08/17/23 1905 110/67 98.1  F (36.7  C) Oral -- -- 100 %   08/17/23 1900 -- -- -- -- -- 100 %   08/17/23 1855 -- -- -- -- -- 100 %   08/17/23 1850 -- -- -- -- -- 100 %   08/17/23 1845 -- -- -- -- -- 99 %   08/17/23 1840 -- -- -- -- -- 100 %   08/17/23 1835 124/82 -- -- -- -- 99 %   08/17/23 1830 -- 97.9  F (36.6  C) Oral -- -- 100  %   08/17/23 1805 -- 98.1  F (36.7  C) Axillary -- 17 --   08/17/23 1804 -- -- -- -- -- 100 %   08/17/23 1802 124/83 -- -- -- -- --   08/17/23 1759 -- -- -- -- -- 100 %   08/17/23 1756 125/81 -- -- -- -- --   08/17/23 1755 -- -- -- -- -- 100 %   08/17/23 1750 121/78 -- -- -- -- 100 %   08/17/23 1745 129/79 -- -- -- -- 100 %   08/17/23 1740 121/79 -- -- -- -- 100 %   08/17/23 1735 116/75 -- -- -- -- 100 %   08/17/23 1730 123/79 -- -- -- -- 100 %   08/17/23 1725 125/81 98.1  F (36.7  C) Oral -- 17 99 %   08/17/23 1720 132/85 -- -- -- -- 100 %   08/17/23 1715 129/69 -- -- -- -- --   08/17/23 1713 134/66 -- -- -- -- 100 %   08/17/23 1711 (!) 140/69 -- -- -- -- --   08/17/23 1710 135/76 -- -- -- -- 99 %   08/17/23 1707 (!) 158/97 -- -- -- -- --   08/17/23 1703 -- -- -- -- -- 99 %   08/17/23 1657 -- -- -- -- -- 99 %   08/17/23 1652 -- -- -- -- -- 99 %   08/17/23 1647 -- -- -- -- -- 100 %   08/17/23 1630 136/73 -- -- -- -- --   08/17/23 1500 124/79 98.1  F (36.7  C) Oral 74 16 99 %   08/17/23 1415 124/78 -- -- -- -- --   08/17/23 1410 124/78 98  F (36.7  C) Oral 81 17 100 %   08/17/23 1302 127/87 98.1  F (36.7  C) Oral 84 17 100 %   08/17/23 1209 126/81 98.6  F (37  C) -- -- 17 --   08/17/23 1207 -- -- -- -- -- 99 %   08/17/23 1126 132/88 98.1  F (36.7  C) Oral 82 17 100 %     Gen:  Resting comfortably, NAD  CV:  Regular rate  Pulm:  Non-labored breathing.  No cough or wheezing.   Abd:  Soft, appropriately tender to palpation, non-distended.  Fundus at  umbilicus, firm and non-tender.  Ext:  Non-tender, 1+ LE edema b/l    Labs:   Component      Latest Ref Rn 8/18/2023  4:37 AM   WBC      4.0 - 11.0 10e3/uL 20.3 (H)    RBC Count      3.80 - 5.20 10e6/uL 3.45 (L)    Hemoglobin      11.7 - 15.7 g/dL 10.1 (L)    Hematocrit      35.0 - 47.0 % 32.3 (L)    MCV      78 - 100 fL 94    MCH      26.5 - 33.0 pg 29.3    MCHC      31.5 - 36.5 g/dL 31.3 (L)    RDW      10.0 - 15.0 % 15.3 (H)    Platelet Count      150 - 450 10e3/uL 145  (L)    Fibrinogen      170 - 490 mg/dL 321    INR      0.85 - 1.15  1.11    PTT      22 - 38 Seconds 29       Legend:  (H) High  (L) Low    Assessment/Plan:  32 year old  on PPD #0 s/p  following IOL for late term gestation c/b complex perineal laceration and uterine atony resulting in PPH.  Continue with routine postpartum management.     Pregnancy c/b: IVF, GBS carrier, hypothyroidism on Synthroid     Pain: Well-controlled with ibuprofen and tylenol, recommended scheduling these with TUCK and ice packs.  Hgb: PPH secondary to complex laceration and uterine atony; Hgb 12.0>EBL 1321 (Pit, TXA)> 10.1.  Platelets 145k, coags o/w WNL. Patient did had syncopal episode at the edge of the bed this AM.  Did review reasons for blood transfusion; symptoms or Hgb near 7.0, patient states understanding and consent signed if need arises.  Did review recommendations for IV iron, she is amenable.  : No void since straight cath after delivery around 0100; nurse brought patient to bathroom and she had spontaneous void of large amount of urine, continue to watch closely.   GI:  BID Senna/Colace.  PRN Simethicone.   PPx:  Encouraged ambulation   Rh: Positive  Rubella: Immune  Feed: Breast  BC: Undecided     Dispo: Plan for home on PPD#2-3.     Marie Ann MD   Pager: [phone]   2023

## 2023-08-18 NOTE — PLAN OF CARE
Data: Laurel Cowan transferred to 425 via wheelchair at 0530. Baby transferred via bassinet.  Action: Receiving unit notified of transfer: Yes. Patient and family notified of room change. Report given to Fanny RN at 0530. Belongings sent to receiving unit. Accompanied by Registered Nurse. Oriented patient to surroundings. Call light within reach. ID bands double-checked with receiving RN.  Response: Patient tolerated transfer and is stable.

## 2023-08-18 NOTE — PLAN OF CARE
Goal Outcome Evaluation:      Plan of Care Reviewed With: patient, spouse    Overall Patient Progress: improvingOverall Patient Progress: improving         Patient arrived to post partum this morning at 0530. Vital signs stable, fundus is firm with light bleeding. Ice is applied to perineum area, pain managed with PRN pain medications. Patient is breastfeeding baby. Due to get up and try to void by 0720. Spouse at bedside, supportive of patient and baby. First time parents with lots of good questions, education given regarding infant cares and feeding, and told to call and ask for help with anything they need.

## 2023-08-18 NOTE — PLAN OF CARE
VSS on room air, BP on softer side by WDL. Fundus/lochia WDL. Perineal/vaginal lac WDL, ice and tucks applied to phi area intermittently for comfort. Voiding appropriately and and now ambulating independently, dizziness gone. One dose of IV venofer given, CBC recheck ordered for tomorrow morning. Pain adequately controlled with PRN medications tylenol and ibuprofen. Breastfeeding infant with staff assistance q2-3hr, patient would like to see lactation during hospitalization. S/o at bedside, supportive. Positive bonding and attachment with infant observed.

## 2023-08-18 NOTE — PLAN OF CARE
"  Problem: Plan of Care - These are the overarching goals to be used throughout the patient stay.    Goal: Plan of Care Review  Description: The Plan of Care Review/Shift note should be completed every shift.  The Outcome Evaluation is a brief statement about your assessment that the patient is improving, declining, or no change.  This information will be displayed automatically on your shift note.  Outcome: Progressing  Goal: Patient-Specific Goal (Individualized)  Description: You can add care plan individualizations to a care plan. Examples of Individualization might be:  \"Parent requests to be called daily at 9am for status\", \"I have a hard time hearing out of my right ear\", or \"Do not touch me to wake me up as it startles me\".  Outcome: Progressing  Goal: Absence of Hospital-Acquired Illness or Injury  Outcome: Progressing  Intervention: Prevent Skin Injury  Recent Flowsheet Documentation  Taken 8/17/2023 1710 by Ainsley Canchola RN  Body Position:   right   turned  Taken 8/17/2023 1500 by Ainsley Canchola RN  Body Position:   left   turned  Goal: Optimal Comfort and Wellbeing  Outcome: Progressing  Intervention: Monitor Pain and Promote Comfort  Recent Flowsheet Documentation  Taken 8/17/2023 1500 by Ainsley Canchola RN  Pain Management Interventions:   breathing exercises   aromatherapy   quiet environment facilitated   relaxation techniques promoted   repositioned  Taken 8/17/2023 1410 by Ainsley Canchola RN  Pain Management Interventions:   breathing exercises   aromatherapy   quiet environment facilitated   relaxation techniques promoted   repositioned  Taken 8/17/2023 1302 by Ainsley Canchola RN  Pain Management Interventions:   breathing exercises   aromatherapy   quiet environment facilitated   relaxation techniques promoted   repositioned  Taken 8/17/2023 1125 by Ainsley Canchola RN  Pain Management Interventions:   breathing exercises   aromatherapy   quiet environment facilitated   relaxation " techniques promoted   repositioned  Intervention: Provide Person-Centered Care  Recent Flowsheet Documentation  Taken 8/17/2023 1556 by Ainsley Canchola RN  Trust Relationship/Rapport:   care explained   choices provided   thoughts/feelings acknowledged  Taken 8/17/2023 0834 by Ainsley Canchola RN  Trust Relationship/Rapport:   care explained   choices provided   thoughts/feelings acknowledged  Goal: Readiness for Transition of Care  Outcome: Progressing  Flowsheets  Taken 8/17/2023 0925  Anticipated Changes Related to Illness: none  Taken 8/17/2023 0924  Anticipated Changes Related to Illness: none  Concerns to be Addressed: all concerns addressed in this encounter  Barriers to Discharge: none  Intervention: Mutually Develop Transition Plan  Recent Flowsheet Documentation  Taken 8/17/2023 0925 by Ainsley Canchola RN  Anticipated Changes Related to Illness: none  Taken 8/17/2023 0924 by Ainsley Canchola RN  Anticipated Changes Related to Illness: none  Concerns to be Addressed: all concerns addressed in this encounter  Taken 8/17/2023 0923 by Ainsley Canchola RN  Equipment Currently Used at Home: none     Problem: Labor  Goal: Hemostasis  Outcome: Progressing  Goal: Stable Fetal Wellbeing  Outcome: Progressing  Intervention: Promote and Monitor Fetal Wellbeing  Recent Flowsheet Documentation  Taken 8/17/2023 1710 by Ainsley Canchola RN  Body Position:   right   turned  Taken 8/17/2023 1500 by Ainsley Canchola RN  Body Position:   left   turned  Goal: Effective Progression to Delivery  Outcome: Progressing  Goal: Absence of Infection Signs and Symptoms  Outcome: Progressing  Goal: Acceptable Pain Control  Outcome: Progressing  Goal: Normal Uterine Contraction Pattern  Outcome: Progressing       Patient currently in active labor, progressing well. Pain currently managed with epidural and repositioning. VS WNL, continues to leak mec stained fluid. Pitocin infusing per MAR. GBS adequately treated and pcn  continues per MAR. Comfort measure met,  at bedside and pt. Aware and agreeable with plan of care.

## 2023-08-18 NOTE — PROVIDER NOTIFICATION
08/18/23 0405   Provider Notification   Provider Name/Title Dr. Goode   Method of Notification Phone   Request Evaluate - Remote     MD notified that patient had syncopal episode when getting up to go to the bathroom. Pt reporting a little dizziness while resting in bed. Bps stable 100s-110s/70s. Uterus is firm with massage, no clots, scant bleeding.    TORB to change CBC, INR, PTT labs from AM draw to stat, infuse 500 mL bolus of LR after lab comes, infuse another 500mL bag of pitocin at 100 mL/hr now, and to continue monitoring pt on the labor/delivery unit for an hour before transferring to postpartum.

## 2023-08-18 NOTE — ANESTHESIA POSTPROCEDURE EVALUATION
Patient: Laurel Cowan    Procedure: * No procedures listed *       Anesthesia Type:  Epidural    Note:     Postop Pain Control: Uneventful            Sign Out: Well controlled pain   PONV: No   Neuro/Psych: Uneventful            Sign Out: Acceptable/Baseline neuro status   Airway/Respiratory:             Sign Out: Acceptable/Baseline resp. status   CV/Hemodynamics:             Sign Out: Acceptable CV status   Other NRE:    DID A NON-ROUTINE EVENT OCCUR?     Event details/Postop Comments:  .Labor Epidural Post delivery note.      Doing well. VSS Temp normal. Satisfactory respiratory and cardiovascular function. Return of neurologic function. Questions encouraged and answered. Denies positional headache. Minimal side effects easily managed w/ PRN meds. No apparent anesthetic complications. No follow-up required.    I or my partner was immediately available for epidural management.    DANIELLE Engle                 Last vitals:  Vitals:    08/18/23 0500 08/18/23 0603 08/18/23 0744   BP: 117/62 97/63 95/62   Pulse:  73 77   Resp:  16 16   Temp:  97.8  F (36.6  C) 98  F (36.7  C)   SpO2:          Electronically Signed By: Tom Engle DO  August 18, 2023  8:32 AM

## 2023-08-19 LAB
BASOPHILS # BLD AUTO: 0 10E3/UL (ref 0–0.2)
BASOPHILS NFR BLD AUTO: 0 %
EOSINOPHIL # BLD AUTO: 0.1 10E3/UL (ref 0–0.7)
EOSINOPHIL NFR BLD AUTO: 1 %
ERYTHROCYTE [DISTWIDTH] IN BLOOD BY AUTOMATED COUNT: 15.9 % (ref 10–15)
HCT VFR BLD AUTO: 24.7 % (ref 35–47)
HGB BLD-MCNC: 7.8 G/DL (ref 11.7–15.7)
IMM GRANULOCYTES # BLD: 0.2 10E3/UL
IMM GRANULOCYTES NFR BLD: 1 %
LYMPHOCYTES # BLD AUTO: 2.1 10E3/UL (ref 0.8–5.3)
LYMPHOCYTES NFR BLD AUTO: 14 %
MCH RBC QN AUTO: 29.8 PG (ref 26.5–33)
MCHC RBC AUTO-ENTMCNC: 31.6 G/DL (ref 31.5–36.5)
MCV RBC AUTO: 94 FL (ref 78–100)
MONOCYTES # BLD AUTO: 1.2 10E3/UL (ref 0–1.3)
MONOCYTES NFR BLD AUTO: 8 %
NEUTROPHILS # BLD AUTO: 11.6 10E3/UL (ref 1.6–8.3)
NEUTROPHILS NFR BLD AUTO: 76 %
NRBC # BLD AUTO: 0 10E3/UL
NRBC BLD AUTO-RTO: 0 /100
PLATELET # BLD AUTO: 145 10E3/UL (ref 150–450)
RBC # BLD AUTO: 2.62 10E6/UL (ref 3.8–5.2)
WBC # BLD AUTO: 15.3 10E3/UL (ref 4–11)

## 2023-08-19 PROCEDURE — 250N000013 HC RX MED GY IP 250 OP 250 PS 637: Performed by: OBSTETRICS & GYNECOLOGY

## 2023-08-19 PROCEDURE — 36415 COLL VENOUS BLD VENIPUNCTURE: CPT | Performed by: OBSTETRICS & GYNECOLOGY

## 2023-08-19 PROCEDURE — 85004 AUTOMATED DIFF WBC COUNT: CPT | Performed by: OBSTETRICS & GYNECOLOGY

## 2023-08-19 PROCEDURE — 120N000001 HC R&B MED SURG/OB

## 2023-08-19 RX ORDER — MODIFIED LANOLIN
OINTMENT (GRAM) TOPICAL
Qty: 7 G | Refills: 3 | Status: SHIPPED | OUTPATIENT
Start: 2023-08-19

## 2023-08-19 RX ORDER — ACETAMINOPHEN 325 MG/1
650 TABLET ORAL EVERY 4 HOURS PRN
Qty: 40 TABLET | Refills: 0 | Status: SHIPPED | OUTPATIENT
Start: 2023-08-19

## 2023-08-19 RX ORDER — DOCUSATE SODIUM 100 MG/1
100 CAPSULE, LIQUID FILLED ORAL 2 TIMES DAILY
Qty: 30 CAPSULE | Refills: 0 | Status: SHIPPED | OUTPATIENT
Start: 2023-08-19

## 2023-08-19 RX ORDER — HYDROCORTISONE 25 MG/G
CREAM TOPICAL 3 TIMES DAILY PRN
Qty: 30 G | Refills: 0 | Status: SHIPPED | OUTPATIENT
Start: 2023-08-19

## 2023-08-19 RX ORDER — IBUPROFEN 800 MG/1
800 TABLET, FILM COATED ORAL EVERY 6 HOURS PRN
Qty: 40 TABLET | Refills: 1 | Status: SHIPPED | OUTPATIENT
Start: 2023-08-19

## 2023-08-19 RX ADMIN — ACETAMINOPHEN 650 MG: 325 TABLET, FILM COATED ORAL at 08:29

## 2023-08-19 RX ADMIN — LEVOTHYROXINE SODIUM 150 MCG: 0.07 TABLET ORAL at 08:29

## 2023-08-19 RX ADMIN — IBUPROFEN 800 MG: 800 TABLET ORAL at 18:37

## 2023-08-19 RX ADMIN — DOCUSATE SODIUM 100 MG: 100 CAPSULE, LIQUID FILLED ORAL at 21:44

## 2023-08-19 RX ADMIN — IBUPROFEN 800 MG: 800 TABLET ORAL at 12:04

## 2023-08-19 RX ADMIN — DOCUSATE SODIUM 100 MG: 100 CAPSULE, LIQUID FILLED ORAL at 08:29

## 2023-08-19 RX ADMIN — IBUPROFEN 800 MG: 800 TABLET ORAL at 04:36

## 2023-08-19 RX ADMIN — PRENATAL VITAMINS-IRON FUMARATE 27 MG IRON-FOLIC ACID 0.8 MG TABLET 1 TABLET: at 08:29

## 2023-08-19 ASSESSMENT — ACTIVITIES OF DAILY LIVING (ADL)
ADLS_ACUITY_SCORE: 18
ADLS_ACUITY_SCORE: 19
ADLS_ACUITY_SCORE: 18

## 2023-08-19 NOTE — PLAN OF CARE
VSS on room air, BP on softer side but patient denies dizziness or symptoms. Fundus/lochia WDL. Voiding appropriately and ambulating independently. Pain adequately controlled with PRN medications , ice, and tucks pads. Breastfeeding infant independently q2-3hr, also pumping and supplementing with donor milk. S/o at bedside, supportive. Positive bonding and attachment with infant observed.

## 2023-08-19 NOTE — PLAN OF CARE
Goal Outcome Evaluation:     Pt able to get some rest between cares during the ngiht.  States ordered pain medications keeping her comfortable.  Swelling, but no hematoma or bruising noted to laceration area.  Using ice and tucks pads for comfort.  Abdominal binder for support and comfort, as well. Ambulating more now and voiding w/o difficulty.  Encouraged pumping after breastfeeding for stimulation; pt not getting anything yet.  FOB present and supportive.     Plan of Care Reviewed With: patient    Overall Patient Progress: improvingOverall Patient Progress: improving

## 2023-08-19 NOTE — PLAN OF CARE
Vital signs stable.  Pain managed with Tylenol, Ibuprofen, ice, tucks and benzocaine spray. Pt is up independently in room, voiding without difficulty. Breastfeeding, baby has been sleepy.  Started to pump to get adequate stimulation of breasts.   at bedside and supportive. Positive interaction with baby.

## 2023-08-19 NOTE — PROGRESS NOTES
"[facility] Postpartum Note    S:  Laurel Cowan feels well this morning. Was able to sleep last night. Pain control adequate. Lochia minimal. Voiding. Breast feeding. Mood Good.     O:  Vitals were reviewed  Blood pressure 107/59, pulse 89, temperature 97.4  F (36.3  C), temperature source Oral, resp. rate 17, height 1.676 m (5' 6\"), weight 87.1 kg (192 lb), SpO2 100 %, unknown if currently breastfeeding.    General: healthy, alert, and no distress  Abd: soft, appropriately tender, fundus firm  Legs: Non-tender, 0+ pitting edema    No results found for: RH  Rubella: immune    Assessment and Plan:   Postpartum Day #1, status post vaginal delivery, doing well.  -- Routine care, staying to work on feeds  -- F/U 6 weeks w/ Primary OB  -- Discharge meds: see med rec  -- Contraception: considering options    PPH  -Hb 7.8  -syncopal episode 8/18  -complex laceration and uterine atony  -IV iron 8/18    Rebecca Yan MD    "

## 2023-08-20 VITALS
BODY MASS INDEX: 28.82 KG/M2 | HEIGHT: 66 IN | TEMPERATURE: 97.8 F | WEIGHT: 179.3 LBS | HEART RATE: 72 BPM | DIASTOLIC BLOOD PRESSURE: 66 MMHG | SYSTOLIC BLOOD PRESSURE: 107 MMHG | RESPIRATION RATE: 17 BRPM | OXYGEN SATURATION: 100 %

## 2023-08-20 PROCEDURE — 250N000013 HC RX MED GY IP 250 OP 250 PS 637: Performed by: OBSTETRICS & GYNECOLOGY

## 2023-08-20 RX ADMIN — LEVOTHYROXINE SODIUM 150 MCG: 0.07 TABLET ORAL at 08:35

## 2023-08-20 RX ADMIN — DOCUSATE SODIUM 100 MG: 100 CAPSULE, LIQUID FILLED ORAL at 08:35

## 2023-08-20 RX ADMIN — PRENATAL VITAMINS-IRON FUMARATE 27 MG IRON-FOLIC ACID 0.8 MG TABLET 1 TABLET: at 08:36

## 2023-08-20 RX ADMIN — IBUPROFEN 800 MG: 800 TABLET ORAL at 08:35

## 2023-08-20 RX ADMIN — IBUPROFEN 800 MG: 800 TABLET ORAL at 01:41

## 2023-08-20 ASSESSMENT — ACTIVITIES OF DAILY LIVING (ADL)
ADLS_ACUITY_SCORE: 18

## 2023-08-20 NOTE — PLAN OF CARE
VSS on room air. Fundus/lochia/lac repair WDL. Voiding appropriately and ambulating independently. Pain adequately controlled with scheduled and PRN medications as well as ice and tucks pads. Breastfeeding, pumping, and bottle feeding infant independently q2-3hr. S/o at bedside, supportive. Positive bonding and attachment with infant observed.      Birth Certificate and PP depression screen received. Education completed and AVS/discharge paperwork reviewed.  Patient indicates understanding discharge instructions. Questions answered, concerns addressed, resources provided. Verbalizes when to return to clinic for follow up for herself and infant.  Prescriptions reviewed, received, and delivered to patient.  Staff escorted patient and infant off unit with AVS/discharge paperwork, prescriptions, pump, and personal belongings.

## 2023-08-20 NOTE — PROGRESS NOTES
"[facility] Postpartum Note    S:  Laurel Cowan feels well this morning. Was able to sleep last night. Pain control adequate. Lochia minimal. Voiding. Breast and donor milk feeding. Mood Good.     O:  Vitals were reviewed  Blood pressure 106/57, pulse 81, temperature 97.6  F (36.4  C), temperature source Oral, resp. rate 16, height 1.676 m (5' 6\"), weight 87.1 kg (192 lb), SpO2 100 %, unknown if currently breastfeeding.    General: healthy, alert, and no distress  Abd: soft, appropriately tender, fundus firm  Legs: Non-tender, 0+ pitting edema    No results found for: RH  Rubella: immune    Assessment and Plan:   Postpartum Day #2, status post vaginal delivery, doing well.  -- Routine care, discharge today  -- F/U 6 weeks w/ Primary OB  -- Discharge meds: see med rec  -- Contraception: considering options     PPH  -Hb 7.8  -syncopal episode 8/18  -complex laceration and uterine atony  -IV iron 8/18     Rebecca Yan MD    "

## 2023-10-22 ENCOUNTER — HEALTH MAINTENANCE LETTER (OUTPATIENT)
Age: 33
End: 2023-10-22

## 2024-12-15 ENCOUNTER — HEALTH MAINTENANCE LETTER (OUTPATIENT)
Age: 34
End: 2024-12-15